# Patient Record
Sex: MALE | Race: WHITE | Employment: FULL TIME | URBAN - METROPOLITAN AREA
[De-identification: names, ages, dates, MRNs, and addresses within clinical notes are randomized per-mention and may not be internally consistent; named-entity substitution may affect disease eponyms.]

---

## 2018-05-09 PROBLEM — I10 ESSENTIAL HYPERTENSION: Status: ACTIVE | Noted: 2018-05-09

## 2024-06-23 ENCOUNTER — APPOINTMENT (OUTPATIENT)
Dept: GENERAL RADIOLOGY | Age: 59
End: 2024-06-23
Attending: NURSE PRACTITIONER

## 2024-06-23 ENCOUNTER — HOSPITAL ENCOUNTER (OUTPATIENT)
Age: 59
Discharge: HOME | End: 2024-06-23

## 2024-06-23 VITALS
TEMPERATURE: 99 F | DIASTOLIC BLOOD PRESSURE: 85 MMHG | HEART RATE: 80 BPM | OXYGEN SATURATION: 95 % | RESPIRATION RATE: 16 BRPM | SYSTOLIC BLOOD PRESSURE: 141 MMHG

## 2024-06-23 DIAGNOSIS — S22.32XA CLOSED FRACTURE OF ONE RIB OF LEFT SIDE, INITIAL ENCOUNTER: Primary | ICD-10-CM

## 2024-06-23 PROCEDURE — 99203 OFFICE O/P NEW LOW 30 MIN: CPT

## 2024-06-23 PROCEDURE — 99204 OFFICE O/P NEW MOD 45 MIN: CPT

## 2024-06-23 PROCEDURE — 71101 X-RAY EXAM UNILAT RIBS/CHEST: CPT | Performed by: NURSE PRACTITIONER

## 2024-06-23 RX ORDER — NAPROXEN 500 MG/1
500 TABLET ORAL 2 TIMES DAILY WITH MEALS
Qty: 28 TABLET | Refills: 0 | Status: SHIPPED | OUTPATIENT
Start: 2024-06-23 | End: 2024-07-07

## 2024-06-23 RX ORDER — CYCLOBENZAPRINE HCL 10 MG
10 TABLET ORAL 3 TIMES DAILY PRN
Qty: 21 TABLET | Refills: 0 | Status: SHIPPED | OUTPATIENT
Start: 2024-06-23 | End: 2024-06-25

## 2024-06-23 NOTE — ED INITIAL ASSESSMENT (HPI)
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 Patient reports he was bent over and standing up when a player hit him in the right thigh and knocked him over onto his left side.

## 2024-06-23 NOTE — DISCHARGE INSTRUCTIONS
As discussed, there is a rib fracture, fifth rib, left side.  Nondisplaced.  Naproxen prescribed for pain relief, you may take this medication up to twice a day for 14 days.  Do not take any other NSAIDs such as: Motrin, Advil, Aleve, ibuprofen while taking naproxen.  Tylenol okay for breakthrough pain.  Please apply ice and or heat to rib cage.  He may hold pillow to rib cage when coughing, sneezing, laughing, changing positions.  Recommend that you take an over-the-counter stool softener to help facilitate bowel movement.    Flexeril as needed for muscle relaxation of neck, thigh, muscles.  This medication can cause dizziness and drowsiness.  It may increase risk of falls.    Please use incentive spirometer, 10 times hour while awake.    Go to ER if you have any chest pain, dizziness, lightheadedness, palpitations, shortness of breath.

## 2024-06-25 NOTE — ED PROVIDER NOTES
Patient Seen in: [facility]      History     Chief Complaint   Patient presents with    Fall     Stated Complaint: rib pain    Subjective: This is a 59-year-old male, past medical history of hypertension, diverticulosis [facility] to immediate care with complaints of left-sided rib pain and right sided thigh discomfort.  Patient states he is a  and was accidentally tackled by a player running full speed and pads and helmets.  Patient states that he was struck the right side, primarily thigh but fell on his left side.  He reports his right thigh pain is improving.  Initially he felt \"a charley horse in my right thigh\".  Patient did not hit his head.  No LOC.  This occurred on Thursday.  Patient reports left-sided rib pain is more severe with bending, twisting, lifting.  Denies any chest pain, dizziness, lightheadedness, palpitations, shortness of breath.  Patient is well-appearing.  Speaking in complete full senses without difficulty.  AOx4  The history is provided by the patient.           Objective:   Past Medical History:    Other and unspecified hyperlipidemia    Sigmoid diverticulosis    Unspecified essential hypertension              Past Surgical History:   Procedure Laterality Date    Cholecystectomy  02/27/14    Colonoscopy  2014    adenoma- repeat 1 yr (poor prep)    Colonoscopy  10/15    normal- repeat 5 yrs (TVA with HGD)    Colonoscopy  08/2021    Becky, +TA polyps, rpt 5 yrs    Colonoscopy & polypectomy  12/16    polyps; tics- repeat 3 yrs (adenoma)    Colonoscopy,remv lesn,snare N/A 10/12/2015    Procedure: COLONOSCOPY, POSSIBLE BIOPSY, POSSIBLE POLYPECTOMY 16288;  Surgeon: Fareed Pena MD;  Location: [facility]    Colonoscpy, flexible, proximal to splenic flexure; w/directed submucosa injection(s), any substance N/A 10/12/2015    Procedure: COLONOSCOPY, POSSIBLE BIOPSY, POSSIBLE POLYPECTOMY 74527;  Surgeon: Fareed Pena MD;  Location: [facility] [place]  [place], [facility]    Hernia surgery  2/27/14    Tonsillectomy      Upper gi endo no barretts  10/15    small HH; esophagitis (minimal)                Social History     Socioeconomic History    Marital status:    Occupational History    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    Respiratory: Negative.  Negative for chest tightness and shortness of breath.    Cardiovascular: Negative.  Negative for chest pain, palpitations and leg swelling.   Gastrointestinal: Negative.    Genitourinary: Negative.    Musculoskeletal:  Positive for arthralgias and neck pain. Negative for back pain, gait problem, joint swelling, myalgias and neck stiffness.   Skin: Negative.    Neurological: Negative.        Positive for stated complaint: rib pain  Other systems are as noted in HPI.  Constitutional and vital signs reviewed.      All other systems reviewed and negative except as noted above.    Physical Exam     ED Triage Vitals [06/23/24 1235]   /85   Pulse 80   Resp 16   Temp 98.7 °F (37.1 °C)   Temp src Temporal   SpO2 95 %   O2 Device None (Room air)       Current Vitals:   No data recorded        Physical Exam  Constitutional:       General: He is not in acute distress.     Appearance: Normal appearance. He is not ill-appearing or toxic-appearing.   HENT:      Head: Normocephalic.   Neck:     Cardiovascular:      Rate and Rhythm: Normal rate and regular rhythm.      Pulses: Normal pulses.      Heart sounds: Normal heart sounds.   Pulmonary:      Effort: Pulmonary effort is normal.      Breath sounds: Normal breath sounds.   Chest:      Chest wall: Tenderness present.       Musculoskeletal:         General: Tenderness present. No swelling, deformity or  signs of injury.      Cervical back: Normal range of motion. No edema, erythema, signs of trauma, rigidity, torticollis, tenderness or crepitus. Muscular tenderness present. No pain with movement or spinous process tenderness.   Lymphadenopathy:      Cervical: No cervical adenopathy.   Skin:     General: Skin is warm.      Capillary Refill: Capillary refill takes less than 2 seconds.   Neurological:      General: No focal deficit present.      Mental Status: He is alert and oriented to person, place, and time.               ED Course   Labs Reviewed - No data to display       Narrative   PROCEDURE: XR RIBS WITH CHEST (3 VIEWS), LEFT (CPT=71101)     COMPARISON: None.     INDICATIONS: Left anterior upper rib pain x2 days post being tackled.     TECHNIQUE:   Four views.       FINDINGS:                  Impression   CONCLUSION: Nondisplaced lateral 5th rib fracture           Dictated by (CST): Chon Coker MD on 6/23/2024 at 1:20 PM      Finalized by (CST): Chon Coker MD on 6/23/2024 at 1:21 PM                   MDM        Differentials considered include: Acute rib fracture, rib contusion, chest wall contusion, pneumothorax.    Patient lungs are clear to auscultation.  No adventitious breath sounds.  No tachypnea, tachycardia, hypoxia.  Patient is well-appearing.  Low suspicion for pneumothorax.  X-ray obtained, independently reviewed by provider, no pneumothorax appreciated.  However, fifth rib fracture, nondisplaced.    Did discuss with patient findings.  Incentive spirometer given to patient.  He is aware to use 10 times an hour while awake.  Patient is aware of naproxen prescribed.  He declines Flexeril.  He is aware to apply ice and heat to chest wall.  He is aware he can hold the pillow to chest wall when he is to cough, sneeze, straining for bowel movement.    Patient is aware to go to ER if he has any chest pain, dizziness, lightheadedness, palpitations, shortness of breath, he verbalizes understanding agrees  with plan of care.                                 Medical Decision Making      Disposition and Plan     Clinical Impression:  1. Closed fracture of one rib of left side, initial encounter         Disposition:  Discharge  6/23/2024  1:28 pm    Follow-up:  Deny Quick MD  [address] [address]  [phone]      As needed          Medications Prescribed:  Discharge Medication List as of 6/23/2024  1:28 PM        START taking these medications    Details   naproxen 500 MG Oral Tab Take 1 tablet (500 mg total) by mouth 2 (two) times daily with meals for 14 days., Normal, Disp-28 tablet, R-0      cyclobenzaprine 10 MG Oral Tab Take 1 tablet (10 mg total) by mouth 3 (three) times daily as needed for Muscle spasms., Normal, Disp-21 tablet, R-0